# Patient Record
Sex: MALE | Race: WHITE | NOT HISPANIC OR LATINO | Employment: FULL TIME | URBAN - METROPOLITAN AREA
[De-identification: names, ages, dates, MRNs, and addresses within clinical notes are randomized per-mention and may not be internally consistent; named-entity substitution may affect disease eponyms.]

---

## 2019-07-15 ENCOUNTER — OFFICE VISIT (OUTPATIENT)
Dept: URGENT CARE | Facility: PHYSICIAN GROUP | Age: 29
End: 2019-07-15
Payer: COMMERCIAL

## 2019-07-15 VITALS
DIASTOLIC BLOOD PRESSURE: 82 MMHG | HEIGHT: 70 IN | WEIGHT: 214 LBS | OXYGEN SATURATION: 99 % | HEART RATE: 62 BPM | BODY MASS INDEX: 30.64 KG/M2 | RESPIRATION RATE: 16 BRPM | SYSTOLIC BLOOD PRESSURE: 136 MMHG | TEMPERATURE: 98.2 F

## 2019-07-15 DIAGNOSIS — Z55.9 SCHOOL PROBLEM: ICD-10-CM

## 2019-07-15 PROCEDURE — 99212 OFFICE O/P EST SF 10 MIN: CPT | Performed by: FAMILY MEDICINE

## 2019-07-15 SDOH — EDUCATIONAL SECURITY - EDUCATION ATTAINMENT: PROBLEMS RELATED TO EDUCATION AND LITERACY, UNSPECIFIED: Z55.9

## 2019-07-15 ASSESSMENT — ENCOUNTER SYMPTOMS
SORE THROAT: 0
VOMITING: 0
CHILLS: 0
FEVER: 0
NAUSEA: 0

## 2019-07-15 NOTE — LETTER
July 15, 2019         Patient: Júnior Landis   YOB: 1990   Date of Visit: 7/15/2019           To Whom it May Concern:    Júnior Landis was seen in my clinic on 7/15/2019. He may return to school on 7/15/2019..    If you have any questions or concerns, please don't hesitate to call.        Sincerely,           Mark Becerril M.D.  Electronically Signed

## 2019-07-15 NOTE — PROGRESS NOTES
"Subjective:   Júnior Landis is a 28 y.o. male who presents for Letter for School/Work (pt needs note for school)     Other   This is a new problem. The current episode started today. The problem occurs constantly. The problem has been unchanged. Pertinent negatives include no chills, fever, nausea, rash, sore throat or vomiting.     Review of Systems   Constitutional: Negative for chills and fever.   HENT: Negative for sore throat.    Gastrointestinal: Negative for nausea and vomiting.   Skin: Negative for rash.      Objective:   /82   Pulse 62   Temp 36.8 °C (98.2 °F)   Resp 16   Ht 1.778 m (5' 10\")   Wt 97.1 kg (214 lb)   SpO2 99%   BMI 30.71 kg/m²   Physical Exam   Constitutional: He is oriented to person, place, and time. He appears well-developed and well-nourished. No distress.   HENT:   Head: Normocephalic and atraumatic.   Eyes: Pupils are equal, round, and reactive to light. Conjunctivae and EOM are normal.   Cardiovascular: Normal rate and regular rhythm.    No murmur heard.  Pulmonary/Chest: Effort normal and breath sounds normal. No respiratory distress.   Abdominal: Soft. He exhibits no distension. There is no tenderness.   Neurological: He is alert and oriented to person, place, and time. He has normal reflexes. No sensory deficit.   Skin: Skin is warm and dry.   Psychiatric: He has a normal mood and affect.        Assessment/Plan:   1. School problem  Note given to return to school  Differential diagnosis, natural history, supportive care, and indications for immediate follow-up discussed.    "